# Patient Record
Sex: MALE | NOT HISPANIC OR LATINO | Employment: UNEMPLOYED | URBAN - METROPOLITAN AREA
[De-identification: names, ages, dates, MRNs, and addresses within clinical notes are randomized per-mention and may not be internally consistent; named-entity substitution may affect disease eponyms.]

---

## 2019-07-30 DIAGNOSIS — M79.604 PAIN OF RIGHT LOWER EXTREMITY: Primary | ICD-10-CM

## 2020-11-21 ENCOUNTER — LAB REQUISITION (OUTPATIENT)
Dept: LAB | Age: 25
End: 2020-11-21

## 2020-11-21 PROCEDURE — 86592 SYPHILIS TEST NON-TREP QUAL: CPT | Performed by: CLINICAL MEDICAL LABORATORY

## 2020-11-21 PROCEDURE — 87389 HIV-1 AG W/HIV-1&-2 AB AG IA: CPT | Performed by: CLINICAL MEDICAL LABORATORY

## 2020-11-21 PROCEDURE — 86709 HEPATITIS A IGM ANTIBODY: CPT | Performed by: CLINICAL MEDICAL LABORATORY

## 2020-11-21 PROCEDURE — 87591 N.GONORRHOEAE DNA AMP PROB: CPT | Performed by: CLINICAL MEDICAL LABORATORY

## 2020-11-21 PROCEDURE — PSEU9913 CHLAMYDIA/GONORRHEA BY NUCLEIC ACID AMPLIFICATION: Performed by: CLINICAL MEDICAL LABORATORY

## 2020-11-21 PROCEDURE — PSEU8107 HERPES SIMPLEX VIRUS 2 GLYCOPROTEIN: Performed by: CLINICAL MEDICAL LABORATORY

## 2020-11-21 PROCEDURE — 87661 TRICHOMONAS VAGINALIS AMPLIF: CPT | Performed by: CLINICAL MEDICAL LABORATORY

## 2020-11-21 PROCEDURE — PSEU8570 RPR: Performed by: CLINICAL MEDICAL LABORATORY

## 2020-11-21 PROCEDURE — PSEU9964 TRICHOMONAS VAGINALIS BY NUCLEIC ACID AMPLIFICATION: Performed by: CLINICAL MEDICAL LABORATORY

## 2020-11-21 PROCEDURE — 86696 HERPES SIMPLEX TYPE 2 TEST: CPT | Performed by: CLINICAL MEDICAL LABORATORY

## 2020-11-21 PROCEDURE — 87340 HEPATITIS B SURFACE AG IA: CPT | Performed by: CLINICAL MEDICAL LABORATORY

## 2020-11-21 PROCEDURE — PSEU8524 HEPATITIS A ANTIBODY IGM: Performed by: CLINICAL MEDICAL LABORATORY

## 2020-11-21 PROCEDURE — PSEU8201 HERPES SIMPLEX VIRUS 1 GLYCOPROTEIN: Performed by: CLINICAL MEDICAL LABORATORY

## 2020-11-21 PROCEDURE — 86695 HERPES SIMPLEX TYPE 1 TEST: CPT | Performed by: CLINICAL MEDICAL LABORATORY

## 2020-11-21 PROCEDURE — PSEU8482 HEPATITIS B SURFACE ANTIGEN: Performed by: CLINICAL MEDICAL LABORATORY

## 2020-11-21 PROCEDURE — 87491 CHLMYD TRACH DNA AMP PROBE: CPT | Performed by: CLINICAL MEDICAL LABORATORY

## 2020-11-21 PROCEDURE — PSEU8528 HEPATITIS C ANTIBODY WITH REFLEX: Performed by: CLINICAL MEDICAL LABORATORY

## 2020-11-21 PROCEDURE — 86803 HEPATITIS C AB TEST: CPT | Performed by: CLINICAL MEDICAL LABORATORY

## 2020-11-21 PROCEDURE — PSEU8483 HIV ANTIGEN/ANTIBODY SCREEN: Performed by: CLINICAL MEDICAL LABORATORY

## 2020-11-30 ENCOUNTER — APPOINTMENT (OUTPATIENT)
Dept: LAB | Age: 25
End: 2020-11-30

## 2020-12-01 LAB
C TRACH RRNA SPEC QL NAA+PROBE: NEGATIVE
HAV IGM SER QL: NEGATIVE
HBV SURFACE AG SER QL: NEGATIVE
HCV AB SER QL: NEGATIVE
HIV 1+2 AB+HIV1 P24 AG SERPL QL IA: NONREACTIVE
Lab: NORMAL
N GONORRHOEA RRNA SPEC QL NAA+PROBE: NEGATIVE
RPR SER QL: NONREACTIVE
T VAGINALIS RRNA SPEC QL NAA+PROBE: NEGATIVE

## 2020-12-02 LAB
HSV1 GG IGG SER-ACNC: POSITIVE
HSV2 GG IGG SER-ACNC: NEGATIVE

## 2021-01-01 ENCOUNTER — LAB REQUISITION (OUTPATIENT)
Dept: LAB | Age: 26
End: 2021-01-01

## 2021-01-01 DIAGNOSIS — Z00.00 ENCOUNTER FOR GENERAL ADULT MEDICAL EXAMINATION WITHOUT ABNORMAL FINDINGS: ICD-10-CM

## 2021-01-04 ENCOUNTER — APPOINTMENT (OUTPATIENT)
Dept: LAB | Age: 26
End: 2021-01-04

## 2021-01-04 PROCEDURE — 86592 SYPHILIS TEST NON-TREP QUAL: CPT | Performed by: CLINICAL MEDICAL LABORATORY

## 2021-01-04 PROCEDURE — 86803 HEPATITIS C AB TEST: CPT | Performed by: CLINICAL MEDICAL LABORATORY

## 2021-01-04 PROCEDURE — 87491 CHLMYD TRACH DNA AMP PROBE: CPT | Performed by: CLINICAL MEDICAL LABORATORY

## 2021-01-04 PROCEDURE — PSEU8201 HERPES SIMPLEX VIRUS 1 GLYCOPROTEIN: Performed by: CLINICAL MEDICAL LABORATORY

## 2021-01-04 PROCEDURE — PSEU8107 HERPES SIMPLEX VIRUS 2 GLYCOPROTEIN: Performed by: CLINICAL MEDICAL LABORATORY

## 2021-01-04 PROCEDURE — PSEU8570 RPR: Performed by: CLINICAL MEDICAL LABORATORY

## 2021-01-04 PROCEDURE — PSEU8528 HEPATITIS C ANTIBODY WITH REFLEX: Performed by: CLINICAL MEDICAL LABORATORY

## 2021-01-04 PROCEDURE — PSEU9913 CHLAMYDIA/GONORRHEA BY NUCLEIC ACID AMPLIFICATION: Performed by: CLINICAL MEDICAL LABORATORY

## 2021-01-04 PROCEDURE — 87340 HEPATITIS B SURFACE AG IA: CPT | Performed by: CLINICAL MEDICAL LABORATORY

## 2021-01-04 PROCEDURE — PSEU8483 HIV ANTIGEN/ANTIBODY SCREEN: Performed by: CLINICAL MEDICAL LABORATORY

## 2021-01-04 PROCEDURE — 86709 HEPATITIS A IGM ANTIBODY: CPT | Performed by: CLINICAL MEDICAL LABORATORY

## 2021-01-04 PROCEDURE — 87389 HIV-1 AG W/HIV-1&-2 AB AG IA: CPT | Performed by: CLINICAL MEDICAL LABORATORY

## 2021-01-04 PROCEDURE — PSEU8482 HEPATITIS B SURFACE ANTIGEN: Performed by: CLINICAL MEDICAL LABORATORY

## 2021-01-04 PROCEDURE — PSEU8524 HEPATITIS A ANTIBODY IGM: Performed by: CLINICAL MEDICAL LABORATORY

## 2021-01-04 PROCEDURE — 87591 N.GONORRHOEAE DNA AMP PROB: CPT | Performed by: CLINICAL MEDICAL LABORATORY

## 2021-01-04 PROCEDURE — 86696 HERPES SIMPLEX TYPE 2 TEST: CPT | Performed by: CLINICAL MEDICAL LABORATORY

## 2021-01-04 PROCEDURE — 86695 HERPES SIMPLEX TYPE 1 TEST: CPT | Performed by: CLINICAL MEDICAL LABORATORY

## 2021-01-05 LAB
C TRACH RRNA SPEC QL NAA+PROBE: NEGATIVE
HAV IGM SER QL: NEGATIVE
HBV SURFACE AG SER QL: NEGATIVE
HCV AB SER QL: NEGATIVE
HIV 1+2 AB+HIV1 P24 AG SERPL QL IA: NONREACTIVE
Lab: NORMAL
N GONORRHOEA RRNA SPEC QL NAA+PROBE: NEGATIVE
RPR SER QL: NONREACTIVE

## 2021-01-06 LAB
HSV1 GG IGG SER-ACNC: POSITIVE
HSV2 GG IGG SER-ACNC: NEGATIVE

## 2021-08-01 ENCOUNTER — TELEPHONE (OUTPATIENT)
Dept: SPORTS MEDICINE | Facility: CLINIC | Age: 26
End: 2021-08-01

## 2021-08-01 ENCOUNTER — HOSPITAL ENCOUNTER (OUTPATIENT)
Dept: RADIOLOGY | Facility: HOSPITAL | Age: 26
Discharge: HOME | End: 2021-08-01
Attending: ORTHOPAEDIC SURGERY

## 2021-08-01 DIAGNOSIS — M79.604 RIGHT LEG PAIN: Primary | ICD-10-CM

## 2021-08-01 DIAGNOSIS — M79.604 RIGHT LEG PAIN: ICD-10-CM

## 2021-08-01 PROCEDURE — 73590 X-RAY EXAM OF LOWER LEG: CPT | Mod: TC,RT

## 2021-08-01 PROCEDURE — 73590 X-RAY EXAM OF LOWER LEG: CPT | Mod: 26,RT,, | Performed by: RADIOLOGY

## 2021-08-01 PROCEDURE — 73590 XR TIBIA FIBULA 2 VIEW RIGHT: ICD-10-PCS | Mod: 26,RT,, | Performed by: RADIOLOGY

## 2021-08-09 ENCOUNTER — HOSPITAL ENCOUNTER (OUTPATIENT)
Dept: RADIOLOGY | Facility: HOSPITAL | Age: 26
Discharge: HOME | End: 2021-08-09
Attending: ORTHOPAEDIC SURGERY
Payer: COMMERCIAL

## 2021-08-09 ENCOUNTER — TELEPHONE (OUTPATIENT)
Dept: SPORTS MEDICINE | Facility: CLINIC | Age: 26
End: 2021-08-09
Payer: COMMERCIAL

## 2021-08-09 DIAGNOSIS — S76.311A STRAIN OF RIGHT HAMSTRING MUSCLE, INITIAL ENCOUNTER: Primary | ICD-10-CM

## 2021-08-09 DIAGNOSIS — S76.311A STRAIN OF RIGHT HAMSTRING MUSCLE, INITIAL ENCOUNTER: ICD-10-CM

## 2021-08-09 PROCEDURE — 73718 MRI LOWER EXTREMITY W/O DYE: CPT | Mod: 26,RT,, | Performed by: RADIOLOGY

## 2021-08-09 PROCEDURE — 73718 MRI FEMUR WITHOUT CONTRAST RIGHT: ICD-10-PCS | Mod: 26,RT,, | Performed by: RADIOLOGY

## 2021-08-09 PROCEDURE — 73718 MRI LOWER EXTREMITY W/O DYE: CPT | Mod: TC,RT

## 2022-04-14 ENCOUNTER — OFFICE VISIT (OUTPATIENT)
Dept: SPORTS MEDICINE | Facility: CLINIC | Age: 27
End: 2022-04-14
Payer: COMMERCIAL

## 2022-04-14 ENCOUNTER — HOSPITAL ENCOUNTER (OUTPATIENT)
Dept: RADIOLOGY | Facility: HOSPITAL | Age: 27
Discharge: HOME | End: 2022-04-14
Attending: ORTHOPAEDIC SURGERY
Payer: COMMERCIAL

## 2022-04-14 DIAGNOSIS — M94.261 CHONDROMALACIA OF KNEE, RIGHT: Primary | ICD-10-CM

## 2022-04-14 DIAGNOSIS — M25.561 ACUTE PAIN OF RIGHT KNEE: ICD-10-CM

## 2022-04-14 PROCEDURE — 73562 XR KNEE ORTHO RIGHT WITH FLEXION: ICD-10-PCS | Mod: 26,LT,, | Performed by: RADIOLOGY

## 2022-04-14 PROCEDURE — 73721 MRI KNEE WITHOUT CONTRAST RIGHT: ICD-10-PCS | Mod: 26,RT,, | Performed by: RADIOLOGY

## 2022-04-14 PROCEDURE — 73721 MRI JNT OF LWR EXTRE W/O DYE: CPT | Mod: 26,RT,, | Performed by: RADIOLOGY

## 2022-04-14 PROCEDURE — 73564 XR KNEE ORTHO RIGHT WITH FLEXION: ICD-10-PCS | Mod: 26,RT,, | Performed by: RADIOLOGY

## 2022-04-14 PROCEDURE — 73562 X-RAY EXAM OF KNEE 3: CPT | Mod: 26,LT,, | Performed by: RADIOLOGY

## 2022-04-14 PROCEDURE — 99999 PR PBB SHADOW E&M-EST. PATIENT-LVL I: ICD-10-PCS | Mod: PBBFAC,,, | Performed by: ORTHOPAEDIC SURGERY

## 2022-04-14 PROCEDURE — 99213 PR OFFICE/OUTPT VISIT, EST, LEVL III, 20-29 MIN: ICD-10-PCS | Mod: S$GLB,,, | Performed by: ORTHOPAEDIC SURGERY

## 2022-04-14 PROCEDURE — 73564 X-RAY EXAM KNEE 4 OR MORE: CPT | Mod: 26,RT,, | Performed by: RADIOLOGY

## 2022-04-14 PROCEDURE — 99999 PR PBB SHADOW E&M-EST. PATIENT-LVL I: CPT | Mod: PBBFAC,,, | Performed by: ORTHOPAEDIC SURGERY

## 2022-04-14 PROCEDURE — 73721 MRI JNT OF LWR EXTRE W/O DYE: CPT | Mod: TC,RT

## 2022-04-14 PROCEDURE — 73562 X-RAY EXAM OF KNEE 3: CPT | Mod: TC,LT

## 2022-04-14 PROCEDURE — 99213 OFFICE O/P EST LOW 20 MIN: CPT | Mod: S$GLB,,, | Performed by: ORTHOPAEDIC SURGERY

## 2022-04-14 NOTE — PROGRESS NOTES
CC: Right knee pain    26 y.o. Male who presents with right knee pain. 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  He reports an approximate 2 week history of anterior/retropatellar right knee pain.  He describes some pain in this area with deep squatting and repetitive bent knee activities.  Also reports some intermittent clicking in the area.  No reported issues during last football season.  Review of the info medical record shows that when he was with his previous team he did have a similar complaint.  MRI was ordered which showed chondromalacia in the patellofemoral compartment.  The patient has had no recent injections or intervention with the right knee.  Does not feel that he can perform currently with his current degree of symptoms.  Limited with training and on the field.    Previous intramedullary jaya on the right side a few years ago.  Prior history of patellar tendinitis complaints.  No patellar tendinitis specific complaints at this time and feels that his pain is not related to that.    REVIEW OF SYSTEMS:   Constitution: Negative. Negative for chills, fever and night sweats.    Hematologic/Lymphatic: Negative for bleeding problem. Does not bruise/bleed easily.   Skin: Negative for dry skin, itching and rash.   Musculoskeletal: Negative for falls. Positive for right knee pain and muscle weakness.     All other review of symptoms were reviewed and found to be noncontributory.     PAST MEDICAL HISTORY:   No past medical history on file.    PAST SURGICAL HISTORY:   No past surgical history on file.    FAMILY HISTORY:   No family history on file.    SOCIAL HISTORY:   Social History     Socioeconomic History    Marital status: Unknown       MEDICATIONS:   No current outpatient medications on file.    ALLERGIES:   Review of patient's allergies indicates:  Not on File     PHYSICAL EXAMINATION:  There were no vitals taken for this visit.  General: Well-developed well-nourished 26 y.o. malein no  acute distress   Cardiovascular: Regular rhythm by palpation of distal pulse, normal color and temperature, no concerning varicosities on symptomatic side   Lungs: No labored breathing or wheezing appreciated   Neuro: Alert and oriented ×3   Psychiatric: well oriented to person, place and time, demonstrates normal mood and affect   Skin: No rashes, lesions or ulcers, normal temperature, turgor, and texture on involved extremity    Ortho/SPM Exam  Examination of the right knee demonstrates intact extensor mechanism. No effusion or prepatellar swelling.  Previous anterior knee incision from prior tibial nailing.  No patellar tendon specific tenderness to palpation.  Nonpainful over the tibial tubercle and proximal tibia.  No palpable abnormalities.  Central patellar tracking. No patellar apprehension. Normal patellar mobility.  No significant patellar facet tenderness.  Minimal patellar grind.  Full, symmetric knee flexion and extension. No pain with forced flexion or extension.  No pain along the joint lines. Negative Randolph's. Negative Lachman.  Negative pivot shift.  Stable to varus/valgus stress testing at 0 and 30 deg. Negative posterior drawer. Ligamentously stable.  Fairly good hamstring stretch.  Intact single leg bridge.  Good quadriceps bulk and strength.    IMAGING:  X-rays including standing, weight bearing AP and flexion bilateral knees, RIGHT knee lateral and sunrise views ordered and images reviewed by me show:    On axial view, subchondral bone irregularity over the central trochlea consistent with likely chondromalacia.  Otherwise well-maintained joint spaces.  Evidence of prior tibial nail.    MRI right knee reviewed by me and discussed with patient. Study shows:  1. Full-thickness fissuring with subchondral edema over the trochlea.  2. Mild edema within the superolateral Hoffa's fat pad.     ASSESSMENT:      ICD-10-CM ICD-9-CM   1. Chondromalacia of knee, right  M94.261 717.7   2. Acute pain of  right knee  M25.561 719.46     PLAN:     Imaging reviewed with the patient.  Findings discussed.  No effusion on exam and no history of recent swelling which is a good sign. Treatment options outlined.  Discussed the potential benefit of viscosupplementation.  He would like to proceed.  We will set him up to receive his first Euflexxa injection upon return to [place].  He has a scheduled flight out of town later today.  And we also will begin a treatment in therapy program.  Discussed anti-inflammatory use and lifting modifications as needed.  All questions answered.  He is in agreement with the plan.    Procedures

## 2022-04-22 ENCOUNTER — OFFICE VISIT (OUTPATIENT)
Dept: SPORTS MEDICINE | Facility: CLINIC | Age: 27
End: 2022-04-22
Payer: COMMERCIAL

## 2022-04-22 DIAGNOSIS — M25.561 ACUTE PAIN OF RIGHT KNEE: Primary | ICD-10-CM

## 2022-04-22 PROCEDURE — 20610 LARGE JOINT ASPIRATION/INJECTION: R KNEE: ICD-10-PCS | Mod: RT,S$GLB,, | Performed by: ORTHOPAEDIC SURGERY

## 2022-04-22 PROCEDURE — 99499 UNLISTED E&M SERVICE: CPT | Mod: S$GLB,,, | Performed by: ORTHOPAEDIC SURGERY

## 2022-04-22 PROCEDURE — 20610 DRAIN/INJ JOINT/BURSA W/O US: CPT | Mod: RT,S$GLB,, | Performed by: ORTHOPAEDIC SURGERY

## 2022-04-22 PROCEDURE — 99499 NO LOS: ICD-10-PCS | Mod: S$GLB,,, | Performed by: ORTHOPAEDIC SURGERY

## 2022-04-22 NOTE — PROGRESS NOTES
Large Joint Aspiration/Injection: R knee    Date/Time: 4/22/2022 10:45 AM  Performed by: Lida Locke MD  Authorized by: Lida Locke MD     Consent Done?:  Yes (Verbal)  Indications:  Pain  Site marked: the procedure site was marked    Timeout: prior to procedure the correct patient, procedure, and site was verified    Prep: patient was prepped and draped in usual sterile fashion      Details:  Needle Size:  22 G  Ultrasonic Guidance for needle placement?: No    Approach:  Anterolateral  Location:  Knee  Site:  R knee  Medications:  20 mg sodium hyaluronate (EUFLEXXA) 10 mg/mL(mw 2.4 -3.6 million)  Patient tolerance:  Patient tolerated the procedure well with no immediate complications

## 2022-04-28 ENCOUNTER — OFFICE VISIT (OUTPATIENT)
Dept: SPORTS MEDICINE | Facility: CLINIC | Age: 27
End: 2022-04-28
Payer: COMMERCIAL

## 2022-04-28 DIAGNOSIS — M25.561 ACUTE PAIN OF RIGHT KNEE: Primary | ICD-10-CM

## 2022-04-28 PROCEDURE — 99499 UNLISTED E&M SERVICE: CPT | Mod: S$GLB,,, | Performed by: ORTHOPAEDIC SURGERY

## 2022-04-28 PROCEDURE — 20610 LARGE JOINT ASPIRATION/INJECTION: R KNEE: ICD-10-PCS | Mod: RT,S$GLB,, | Performed by: ORTHOPAEDIC SURGERY

## 2022-04-28 PROCEDURE — 99499 NO LOS: ICD-10-PCS | Mod: S$GLB,,, | Performed by: ORTHOPAEDIC SURGERY

## 2022-04-28 PROCEDURE — 20610 DRAIN/INJ JOINT/BURSA W/O US: CPT | Mod: RT,S$GLB,, | Performed by: ORTHOPAEDIC SURGERY

## 2022-04-28 NOTE — PROGRESS NOTES
Large Joint Aspiration/Injection: R knee    Date/Time: 4/28/2022 11:45 AM  Performed by: Lida Locke MD  Authorized by: Lida Locke MD     Consent Done?:  Yes (Verbal)  Indications:  Pain  Site marked: the procedure site was marked    Timeout: prior to procedure the correct patient, procedure, and site was verified    Prep: patient was prepped and draped in usual sterile fashion      Details:  Needle Size:  22 G  Ultrasonic Guidance for needle placement?: No    Approach:  Anterolateral  Location:  Knee  Site:  R knee  Medications:  20 mg sodium hyaluronate (EUFLEXXA) 10 mg/mL(mw 2.4 -3.6 million)  Patient tolerance:  Patient tolerated the procedure well with no immediate complications     Euflexxa injection paid for and brought by the [employer] today.

## 2022-05-04 ENCOUNTER — OFFICE VISIT (OUTPATIENT)
Dept: SPORTS MEDICINE | Facility: CLINIC | Age: 27
End: 2022-05-04
Payer: COMMERCIAL

## 2022-05-04 DIAGNOSIS — M94.261 CHONDROMALACIA OF KNEE, RIGHT: Primary | ICD-10-CM

## 2022-05-04 PROCEDURE — 20610 LARGE JOINT ASPIRATION/INJECTION: R KNEE: ICD-10-PCS | Mod: RT,S$GLB,, | Performed by: ORTHOPAEDIC SURGERY

## 2022-05-04 PROCEDURE — 20610 DRAIN/INJ JOINT/BURSA W/O US: CPT | Mod: RT,S$GLB,, | Performed by: ORTHOPAEDIC SURGERY

## 2022-05-04 PROCEDURE — 99499 NO LOS: ICD-10-PCS | Mod: S$GLB,,, | Performed by: ORTHOPAEDIC SURGERY

## 2022-05-04 PROCEDURE — 99499 UNLISTED E&M SERVICE: CPT | Mod: S$GLB,,, | Performed by: ORTHOPAEDIC SURGERY

## 2022-05-04 NOTE — PROGRESS NOTES
Large Joint Aspiration/Injection: R knee    Date/Time: 5/4/2022 11:00 AM  Performed by: Lida Locke MD  Authorized by: Lida Locke MD     Consent Done?:  Yes (Verbal)  Indications:  Pain  Site marked: the procedure site was marked    Timeout: prior to procedure the correct patient, procedure, and site was verified    Prep: patient was prepped and draped in usual sterile fashion      Details:  Needle Size:  22 G  Ultrasonic Guidance for needle placement?: No    Approach:  Anterolateral  Location:  Knee  Site:  R knee  Medications:  20 mg sodium hyaluronate (EUFLEXXA) 10 mg/mL(mw 2.4 -3.6 million)  Patient tolerance:  Patient tolerated the procedure well with no immediate complications     Euflexxa injection brought and paid for by the [employer].

## 2023-07-27 ENCOUNTER — RX ONLY (OUTPATIENT)
Age: 28
Setting detail: RX ONLY
End: 2023-07-27

## 2023-07-27 ENCOUNTER — APPOINTMENT (RX ONLY)
Dept: URBAN - METROPOLITAN AREA CLINIC 120 | Facility: CLINIC | Age: 28
Setting detail: DERMATOLOGY
End: 2023-07-27

## 2023-07-27 DIAGNOSIS — B36.0 PITYRIASIS VERSICOLOR: ICD-10-CM

## 2023-07-27 PROBLEM — L08.9 LOCAL INFECTION OF THE SKIN AND SUBCUTANEOUS TISSUE, UNSPECIFIED: Status: ACTIVE | Noted: 2023-07-27

## 2023-07-27 PROCEDURE — ? COUNSELING

## 2023-07-27 PROCEDURE — ? PRESCRIPTION

## 2023-07-27 PROCEDURE — 99203 OFFICE O/P NEW LOW 30 MIN: CPT

## 2023-07-27 PROCEDURE — ? PRESCRIPTION MEDICATION MANAGEMENT

## 2023-07-27 RX ORDER — MINOCYCLINE HYDROCHLORIDE 100 MG/1
CAPSULE ORAL
Qty: 60 | Refills: 3 | Status: ERX | COMMUNITY
Start: 2023-07-27

## 2023-07-27 RX ORDER — MINOCYCLINE HYDROCHLORIDE 100 MG/1
CAPSULE ORAL BID
Qty: 60 | Refills: 3 | Status: CANCELLED
Stop reason: ENTERED-IN-ERROR

## 2023-07-27 RX ADMIN — MINOCYCLINE HYDROCHLORIDE: 100 CAPSULE ORAL at 00:00

## 2023-07-27 ASSESSMENT — LOCATION DETAILED DESCRIPTION DERM
LOCATION DETAILED: EPIGASTRIC SKIN
LOCATION DETAILED: LEFT POSTERIOR SHOULDER
LOCATION DETAILED: RIGHT MEDIAL UPPER BACK

## 2023-07-27 ASSESSMENT — LOCATION ZONE DERM
LOCATION ZONE: ARM
LOCATION ZONE: TRUNK

## 2023-07-27 ASSESSMENT — LOCATION SIMPLE DESCRIPTION DERM
LOCATION SIMPLE: LEFT SHOULDER
LOCATION SIMPLE: RIGHT UPPER BACK
LOCATION SIMPLE: ABDOMEN

## 2023-07-27 NOTE — PROCEDURE: PRESCRIPTION MEDICATION MANAGEMENT
Render In Strict Bullet Format?: No
Initiate Treatment: minocycline 100 mg capsule \\nQuantity: 60.0 Capsule\\nSig: Take one pill twice daily with a full glass of water. Do not lie down 1 hour after taking.
Detail Level: Zone